# Patient Record
Sex: MALE | Race: AMERICAN INDIAN OR ALASKA NATIVE
[De-identification: names, ages, dates, MRNs, and addresses within clinical notes are randomized per-mention and may not be internally consistent; named-entity substitution may affect disease eponyms.]

---

## 2018-04-23 ENCOUNTER — HOSPITAL ENCOUNTER (OUTPATIENT)
Dept: HOSPITAL 5 - SPVIMAG | Age: 43
Discharge: HOME | End: 2018-04-23
Attending: ORTHOPAEDIC SURGERY
Payer: COMMERCIAL

## 2018-04-23 DIAGNOSIS — M17.11: Primary | ICD-10-CM

## 2018-04-23 DIAGNOSIS — M21.161: ICD-10-CM

## 2018-04-23 DIAGNOSIS — M25.861: ICD-10-CM

## 2018-04-23 NOTE — XRAY REPORT
XRAY RIGHT KNEE 4 THREE VIEWS: 04/23/18





CLINICAL: Right knee pain.



FINDINGS: No fracture or dislocation.  However, mild varus deformity 

and slight lateral subluxation of the tibia.  The medial and lateral 

joint spaces are normal.  Small medial and lateral osteophytes.  

Patellofemoral osteoarthritis with a large superior and lateral 

osteophyte.No joint effusion.A few benign calcifications in the soft 

tissues.



IMPRESSION: Osteoarthritis with greater involvement of the 

patellofemoral joint.  Mild varus deformity and slight lateral 

subluxation of the tibia.